# Patient Record
(demographics unavailable — no encounter records)

---

## 2025-04-01 NOTE — HISTORY OF PRESENT ILLNESS
[de-identified] : Toothache from cavity [FreeTextEntry6] : Ray, 15 y/O M, new pt, presented to [facility] for dental pain caused by cavity.

## 2025-04-01 NOTE — DISCUSSION/SUMMARY
[FreeTextEntry1] : Dental cavity without gingivitis: Avoid chewing on affected side Urgent dental consult recommended Improving dental hygiene advised Ibuprofen dispensed for immediate use. Pt tolerated Family outreach attempted. No answer. Voicemail left  Behavior Health screening performed: Within normal limits.  Age-appropriate anticipatory guidance provided

## 2025-04-01 NOTE — REVIEW OF SYSTEMS
[Fever] : no fever [Chills] : no chills [Headache] : no headache [Cough] : no cough [Weakness] : no weakness [Lightheadness] : no lightheadness [Rash] : no rash

## 2025-04-01 NOTE — RISK ASSESSMENT
[Eats meals with family] : eats meals with family [Has family members/adults to turn to for help] : has family members/adults to turn to for help [Is permitted and is able to make independent decisions] : Is permitted and is able to make independent decisions [Grade: ____] : Grade: [unfilled] [Normal Performance] : normal performance [Normal Behavior/Attention] : normal behavior/attention [Normal Homework] : normal homework [Eats regular meals including adequate fruits and vegetables] : does not eat regular meals including adequate fruits and vegetables [Drinks non-sweetened liquids] : drinks non-sweetened liquids  [Calcium source] : calcium source [Has concerns about body or appearance] : does not have concerns about body or appearance [Has friends] : has friends [At least 1 hour of physical activity a day] : at least 1 hour of physical activity a day [Screen time (except homework) less than 2 hours a day] : no screen time (except homework) less than 2 hours a day [Has interests/participates in community activities/volunteers] : has interests/participates in community activities/volunteers [Uses tobacco] : does not use tobacco [Uses drugs] : does not use drugs  [Drinks alcohol] : does not drink alcohol [Home is free of violence] : home is free of violence [Uses safety belts/safety equipment] : uses safety belts/safety equipment  [Impaired/distracted driving] : no impaired/distracted driving [Has peer relationships free of violence] : has peer relationships free of violence [Has/had oral sex] : has not had oral sex [Has had sexual intercourse] : has not had sexual intercourse [Has ways to cope with stress] : has ways to cope with stress [Displays self-confidence] : displays self-confidence [Has problems with sleep] : does not have problems with sleep [Gets depressed, anxious, or irritable/has mood swings] : does not get depressed, anxious, or irritable/has mood swings [Has thought about hurting self or considered suicide] : has not thought about hurting self or considered suicide [With Teen] : teen [de-identified] : Lives with mother at home. Reports a good relationship with mother.  [de-identified] : [employer]. Attends IEP services in school. [de-identified] : Admits to skipping veggies in daily diet. Eats a few fruits.  [de-identified] : Interested in cooking. Admits to spending 8+ hours a day on video games  [de-identified] : Attracted to girls. Denies coitarche. Exhibits understanding of STDs and their prevention  [de-identified] : [place] with stress by talking to friends and mom. He denies any current mental health concerns. He denies SI or self harm. He is under the care of school counselor as part of the IEP.

## 2025-04-01 NOTE — PHYSICAL EXAM
[EOMI] : grossly EOMI [Clear] : right tympanic membrane clear [Pink Nasal Mucosa] : pink nasal mucosa [NL] : no abnormal lymph nodes palpated [Preauricular] : preauricular [Post Auricular] : post auricular [Parotid] : parotid [Anterior Cervical] : anterior cervical [Posterior Cervical] : posterior cervical [Warm] : warm [FreeTextEntry1] : unclear speech [de-identified] : Round cavity in tooth # 30. No surrounding erythema or edema of gum at this time. No facial swelling notable around dental cavity.